# Patient Record
Sex: MALE | Race: WHITE | NOT HISPANIC OR LATINO
[De-identification: names, ages, dates, MRNs, and addresses within clinical notes are randomized per-mention and may not be internally consistent; named-entity substitution may affect disease eponyms.]

---

## 2022-02-02 PROBLEM — Z00.00 ENCOUNTER FOR PREVENTIVE HEALTH EXAMINATION: Status: ACTIVE | Noted: 2022-02-02

## 2022-03-09 ENCOUNTER — APPOINTMENT (OUTPATIENT)
Dept: HEART AND VASCULAR | Facility: CLINIC | Age: 72
End: 2022-03-09

## 2022-03-22 PROBLEM — Z00.00 ENCOUNTER FOR PREVENTIVE HEALTH EXAMINATION: Status: ACTIVE | Noted: 2022-03-22

## 2022-03-30 ENCOUNTER — APPOINTMENT (OUTPATIENT)
Dept: HEART AND VASCULAR | Facility: CLINIC | Age: 72
End: 2022-03-30

## 2022-04-19 ENCOUNTER — APPOINTMENT (OUTPATIENT)
Dept: HEART AND VASCULAR | Facility: CLINIC | Age: 72
End: 2022-04-19

## 2022-11-17 ENCOUNTER — TRANSCRIPTION ENCOUNTER (OUTPATIENT)
Age: 72
End: 2022-11-17

## 2023-01-01 ENCOUNTER — TRANSCRIPTION ENCOUNTER (OUTPATIENT)
Age: 73
End: 2023-01-01

## 2023-01-02 ENCOUNTER — TRANSCRIPTION ENCOUNTER (OUTPATIENT)
Age: 73
End: 2023-01-02

## 2023-01-07 ENCOUNTER — TRANSCRIPTION ENCOUNTER (OUTPATIENT)
Age: 73
End: 2023-01-07

## 2023-03-17 ENCOUNTER — TRANSCRIPTION ENCOUNTER (OUTPATIENT)
Age: 73
End: 2023-03-17

## 2023-09-01 ENCOUNTER — TRANSCRIPTION ENCOUNTER (OUTPATIENT)
Age: 73
End: 2023-09-01

## 2023-09-19 ENCOUNTER — APPOINTMENT (OUTPATIENT)
Dept: NEUROSURGERY | Facility: CLINIC | Age: 73
End: 2023-09-19

## 2024-01-07 ENCOUNTER — APPOINTMENT (OUTPATIENT)
Dept: NEUROSURGERY | Facility: HOSPITAL | Age: 74
End: 2024-01-07

## 2024-01-07 ENCOUNTER — TRANSCRIPTION ENCOUNTER (OUTPATIENT)
Age: 74
End: 2024-01-07

## 2024-01-18 ENCOUNTER — TRANSCRIPTION ENCOUNTER (OUTPATIENT)
Age: 74
End: 2024-01-18

## 2024-02-06 ENCOUNTER — APPOINTMENT (OUTPATIENT)
Dept: NEUROSURGERY | Facility: CLINIC | Age: 74
End: 2024-02-06
Payer: MEDICARE

## 2024-02-15 ENCOUNTER — APPOINTMENT (OUTPATIENT)
Dept: NEUROSURGERY | Facility: CLINIC | Age: 74
End: 2024-02-15
Payer: MEDICARE

## 2024-02-15 ENCOUNTER — RESULT REVIEW (OUTPATIENT)
Age: 74
End: 2024-02-15

## 2024-02-15 VITALS
BODY MASS INDEX: 27.09 KG/M2 | WEIGHT: 200 LBS | HEART RATE: 78 BPM | HEIGHT: 72 IN | OXYGEN SATURATION: 97 % | DIASTOLIC BLOOD PRESSURE: 65 MMHG | SYSTOLIC BLOOD PRESSURE: 96 MMHG

## 2024-02-15 PROCEDURE — 99024 POSTOP FOLLOW-UP VISIT: CPT

## 2024-02-15 NOTE — ASSESSMENT
[FreeTextEntry1] : EVERETT STOCKTON is a 73 year old male with a PMH of atrial fibrillation on Eliquis (currently on hold), chronic alcohol use, Heart failure with preserved EF s/p ICD, hyperlipidemia, BPH, prostate cancer with mets to bone, presents s/p right sided craniotomy for drainage of acute on chronic subdural hematoma on 1/7/24. He is doing well post op and progressing at [facility]. He is neurologically intact. His incision is healing well.   I reviewed his latest CT scan and compared it to his CT scan on 1/13/24, 1/10/24, and 1/5/24.  He presented with a large right-sided acute subdural hematoma on 1/5/24 with midline shift.  He had no focal neurologic deficits at that time and was watched closely in the ICU with close interval repeat imaging showing stability. The initial goal was for the hemorrhage to become more chronic prior to evacuation to allow for a smaller operation, however 2 days later he was noted to be having speech difficulties and had a minor left upper extremity drift, and at that time it was determined to take him to the operating room for evacuation.  He did well postoperatively and had good evacuation on postop imaging.  He was instructed to continue using fragrance-free shampoo to clean incision twice daily. He should continue the current pain regimen.  He should participate in outpatient PT once discharged from rehab. He should continue to abstain from alcohol. I advised him to follow up with neurology for further AED management.  From neurological standpoint, he should resume Eliquis, but given his gait instability I recommend he first have a discussion with his cardiologist regarding the risk and benefit if he was to fall again. He should return to the office in 4 weeks time for a progress check with CT head prior.  The patient understands the plan of care and is in agreement with it.

## 2024-02-15 NOTE — DATA REVIEWED
[de-identified] :   CLINICAL INDICATION: Post right frontal craniotomy for subdural hematoma  5mm axial sections of the brain were obtained from base to vertex, without the intravenous administration of contrast material. Coronal and sagittal computer generated reconstructed views are available.  Comparison is made with the prior CT of 1/10/2024 and demonstrates no significant interval change.   There is been a right frontal craniotomy. There is a small amount of air and hemorrhage subjacent to the craniotomy flap. There are low density there are small bilateral frontal mixed density subdural collections. A small amount of left frontal subarachnoid hemorrhage is identified. There is a right frontal parafalcine subdural hemorrhage. Mild atrophy.     IMPRESSION: No change since 1/10/2024.    CLINICAL INDICATION: Post removal of right frontal subdural drain for subdural hematoma  5mm axial sections of the brain were obtained from base to vertex, without the intravenous administration of contrast material. Coronal and sagittal computer generated reconstructed views are available.  Comparison is made with prior CT of 1/9/2024.  There is a right frontal craniotomy. The right frontal subdural drain has been removed. There is a small amount of residual mixed density hemorrhage and air. There is left frontal and right parasagittal frontal subarachnoid hemorrhage and a small amount parasagittal subdural hemorrhage, unchanged. Mild atrophy is identified with ventricular and sulcal prominence there has been previous bilateral lens replacement surgery.  IMPRESSION: Post removal of a right frontal subdural drain there is a small amount of residual mixed density collection. No change in right frontal parasagittal and left frontal cortical subarachnoid hemorrhage and a small amount of parafalcine subdural hemorrhage compared with 1/9/2024.    EXAM: CT HEAD WITHOUT INTRAVENOUS CONTRAST  HISTORY: History of right craniotomy for subdural hematoma, follow-up evaluation.  TECHNIQUE: Multiple axial images were obtained from the skull base to the vertex. Sagittal and coronal reformatted images were obtained from the axial data set. The images were reviewed in brain and bone windows.  COMPARISON: CT of the head January 8, 2024  FINDINGS:   Postoperative changes related to a right frontal craniotomy. A subdural drainage catheter is seen in place. Residual subdural hematoma along the right frontal convexity, measuring upwards of 0.6 cm in thickness, grossly stable. No significant mass affect on the adjacent cerebral hemisphere. No significant midline shift. Residual pneumocephalus along the right frontal convexity, mildly improved.  Subdural hemorrhage along the anterior falx, measuring upwards of 0.7 cm in thickness, minimally increased in size when compared to prior imaging, previously 0.5 cm.  Small focus of extra-axial blood along the medial anterior left frontal convexity, grossly stable.  Grossly stable subarachnoid hemorrhage along the left frontal lobe sulci.  No hydrocephalus. Small areas of decreased attenuation in the deep and periventricular white matter, compatible with chronic small vessel disease.  The cranial cervical junction is within normal limits. The sella is not expanded. No depressed calvarial fracture. Partially visualized mucous retention cyst in the right maxillary sinus. The visualized mastoid air cells are well aerated. The visualized orbits are status post cataract surgery.  IMPRESSION:   1.  Postoperative changes related to a right frontal craniotomy. 2.  Residual subdural hematoma along the right frontal convexity, grossly stable. 3.  Subdural hemorrhage along the anterior falx, mildly increased in size when compared to prior imaging.   1/5/24 Technique: CT head was performed utilizing axial images from the base of the skull through the vertex without the administration of intravenous contrast. Images were reviewed in the bone, brain and subdural windows.  Findings: There are no prior studies available for comparison.  There is a right frontal parietal temporal occipital acute subdural hemorrhage measuring approximately 1.7 cm in largest diameter overlying the right frontal convexity. There is mild acute hemorrhage seen within the right parafalcine region and overlying the right tentorium. There is effacement of the right cerebral sulci. There is effacement of the right sylvian fissure. There is midline shift to the left approximately 0.7 cm at the level of the third ventricle. There is mass effect on the right lateral ventricle. There is a small amount of acute hemorrhage within the left central sulcus. There is slight prominence of the temporal horns There is no evidence of an intra-axial fluid collection.  Visualized paranasal sinuses and bilateral mastoid air cells are clear.no acute fractures are seen.  Impression: Right holohemispheric acute cerebral hemorrhage with midline shift to the left approximately 0.7 cm at the level of the third ventricle. Small amount of subarachnoid hemorrhage in the left central sulcus.

## 2024-02-15 NOTE — HISTORY OF PRESENT ILLNESS
[FreeTextEntry1] : EVERETT STOCKTON is a 73 year old male with a PMH of atrial fibrillation on Eliquis (currently on hold), chronic alcohol use, Heart failure with preserved EF, hyperlipidemia, BPH, prostate cancer with mets to bone, presents s/p right sided craniotomy for drainage of acute on chronic subdural hematoma on 1/7/24.  He presented to [facility] after a fall with head strike on 1/4/24.  He denied LOC. He has a history of multiple falls and seen by neurosurgery previously on 8/2023 for small L SDH. Initial CT head on 1/5/24 revealed  Right holohemispheric acute cerebral hemorrhage with midline shift to the left approximately 0.7 cm at the level of the third ventricle. Small amount of subarachnoid hemorrhage in the left central sulcus. Hospital course notable for Kcentra given for Eliquis reversal. He went to the OR on 1/7/24 for right sided craniotomy for drainage of acute on chronic subdural hematoma. He had an episode of slurred speech during his hospitalization after Oxycodone use that resolved. Repeat CT head stable at the time and Keppra increased from 500 to 750 mg BID. Eliquis currently on hold. He was discharged to [facility] on 1/18/24. He underwent repeat CT head today. He has not followed up with neurology for AED management. He has not seen his cardiologist regarding the Eliquis. The patient denies new pain, headaches, numbness, tingling, weakness, bowel/bladder dysfunction, gait disturbance, fever, chills, drainage from incision, redness at incision site.  Surg Hx:    Meds:  Atorvastatin 40 mg, Vitamin D3 1000 units, Furosemide 20 mg, Lansoprazole 30 mg, Metoprolol XL 75 MG, Entresto 24 mg- 26 mg BID, Flomax 0.4 mg Allergies: PCN  Soc Hx: nonsmoker, EtOH use

## 2024-02-15 NOTE — REASON FOR VISIT
[de-identified] :  Right sided craniotomy for drainage of acute on chronic subdural hematoma   [de-identified] : 1/7/24 [de-identified] : 4

## 2024-03-10 ENCOUNTER — NON-APPOINTMENT (OUTPATIENT)
Age: 74
End: 2024-03-10

## 2024-03-10 DIAGNOSIS — I42.9 CARDIOMYOPATHY, UNSPECIFIED: ICD-10-CM

## 2024-03-10 DIAGNOSIS — K86.0 ALCOHOL-INDUCED CHRONIC PANCREATITIS: ICD-10-CM

## 2024-03-10 DIAGNOSIS — I48.0 PAROXYSMAL ATRIAL FIBRILLATION: ICD-10-CM

## 2024-03-10 DIAGNOSIS — I10 ESSENTIAL (PRIMARY) HYPERTENSION: ICD-10-CM

## 2024-03-10 DIAGNOSIS — E78.5 HYPERLIPIDEMIA, UNSPECIFIED: ICD-10-CM

## 2024-03-10 DIAGNOSIS — K21.9 GASTRO-ESOPHAGEAL REFLUX DISEASE W/OUT ESOPHAGITIS: ICD-10-CM

## 2024-03-10 DIAGNOSIS — Z85.46 PERSONAL HISTORY OF MALIGNANT NEOPLASM OF PROSTATE: ICD-10-CM

## 2024-03-10 NOTE — CARDIOLOGY SUMMARY
[de-identified] : 2/22 akinesis of the apical, inferoapical , Adama apical, apical lateral and apical septal segments., lae, ef 30%, RVE, pasp 47mmhg,AMAYA, mac, mild mr

## 2024-03-10 NOTE — PHYSICAL EXAM
[Well Developed] : well developed [Well Nourished] : well nourished [Normal Conjunctiva] : normal conjunctiva [No Acute Distress] : no acute distress [No Carotid Bruit] : no carotid bruit [Normal Venous Pressure] : normal venous pressure [No Murmur] : no murmur [Normal S1, S2] : normal S1, S2 [No Rub] : no rub [No Gallop] : no gallop [Good Air Entry] : good air entry [No Respiratory Distress] : no respiratory distress  [Clear Lung Fields] : clear lung fields [Soft] : abdomen soft [Non Tender] : non-tender [Normal Bowel Sounds] : normal bowel sounds [No Masses/organomegaly] : no masses/organomegaly [No Edema] : no edema [Normal Gait] : normal gait [No Cyanosis] : no cyanosis [No Clubbing] : no clubbing [No Varicosities] : no varicosities [No Rash] : no rash [Moves all extremities] : moves all extremities [No Skin Lesions] : no skin lesions [No Focal Deficits] : no focal deficits [Alert and Oriented] : alert and oriented [Normal Speech] : normal speech [Normal memory] : normal memory

## 2024-03-10 NOTE — HISTORY OF PRESENT ILLNESS
[FreeTextEntry1] : Mr Davalos has a history of NSTEMI 2022, ef 30%,  pafib ,VT with ICD, alcohol use disorder s/p SDH 1/24, s/p craniotomy /evacuation 1/7/24, eliquis reversed. He has had other falls,

## 2024-03-11 ENCOUNTER — APPOINTMENT (OUTPATIENT)
Dept: CARDIOLOGY | Facility: CLINIC | Age: 74
End: 2024-03-11

## 2024-03-22 ENCOUNTER — RESULT REVIEW (OUTPATIENT)
Age: 74
End: 2024-03-22

## 2024-04-05 ENCOUNTER — APPOINTMENT (OUTPATIENT)
Dept: NEUROSURGERY | Facility: CLINIC | Age: 74
End: 2024-04-05
Payer: MEDICARE

## 2024-04-05 DIAGNOSIS — S06.5XAA TRAUMATIC SUBDURAL HEMORRHAGE WITH LOSS OF CONSCIOUSNESS STATUS UNKNOWN, INITIAL ENCOUNTER: ICD-10-CM

## 2024-04-05 DIAGNOSIS — Z98.890 OTHER SPECIFIED POSTPROCEDURAL STATES: ICD-10-CM

## 2024-04-05 PROCEDURE — 99443: CPT | Mod: 93

## 2024-04-08 PROBLEM — Z98.890 STATUS POST CRANIOTOMY: Status: ACTIVE | Noted: 2024-04-08

## 2024-04-08 PROBLEM — S06.5XAA SUBDURAL HEMATOMA: Status: ACTIVE | Noted: 2023-09-01

## 2024-04-08 NOTE — ASSESSMENT
[FreeTextEntry1] : EVERETT STOCKTON is a 73 year old male with a PMH of atrial fibrillation on Eliquis (currently on hold), chronic alcohol use, Heart failure with preserved EF s/p ICD, hyperlipidemia, BPH, prostate cancer with mets to bone, presents s/p right sided craniotomy for drainage of acute on chronic subdural hematoma on 1/7/24. He is doing well post op and progressing at [facility] where he has remained since discharge from the hospital. He is neurologically intact. His incision is healing well.   I reviewed his latest CT scan and compared it to his CT scan on 1/13/24, 1/10/24, 1/5/24, 3/22/24.  The most recent CT scan demonstrated resolution of the subdural hematoma and expected post operative changes.   He presented with a large right-sided acute subdural hematoma on 1/5/24 with midline shift.  He had no focal neurologic deficits at that time and was watched closely in the ICU with close interval repeat imaging showing stability. The initial goal was for the hemorrhage to become more chronic prior to evacuation to allow for a smaller operation, however 2 days later he was noted to be having speech difficulties and had a minor left upper extremity drift, and at that time it was determined to take him to the operating room for evacuation.  He did well postoperatively and had good evacuation on postop imaging.  He was instructed to continue using fragrance-free shampoo to clean incision twice daily. He should continue the current pain regimen.  He should participate in outpatient PT once discharged from rehab. He should continue to abstain from alcohol. I advised him to follow up with neurology for further AED management.  From neurological standpoint, he is safe karmen resume Eliquis, but given his gait instability I recommend he first have a discussion with his cardiologist regarding the risk and benefit if he was to fall again.  He had an appointment with a cardiologist which was cancelled.  He should return to the office in three months time for a progress check with CT head prior (virtual visit is acceptable).  The patient understands the plan of care and is in agreement with it.  I spent 30 minutes relative to this patient encounter.

## 2024-04-08 NOTE — HISTORY OF PRESENT ILLNESS
[FreeTextEntry1] : The patient has given verbal consent for this telehealth visit using two-way audio technology.  The patient is currently located at home [address] [place] [address], and I am located at my office at [facility].  Nelson Allen presents s/p right sided craniotomy for drainage of acute on chronic subdural hematoma on 1/7/24.  He remains in rehab, and is eager to be discharged. He is having difficulty sleeping, noting that he previously took sleeping pills to aid him with this. He underwent repeat CT head which revealed a hypodense epidural collection measuring 1 cm thickness under the skull flap, smaller in size compared to previous CT scan. The patient denies new pain, numbness, tingling, weakness, bowel/bladder dysfunction, gait disturbance, fever, chills, drainage from incision, redness at incision site.  2/15/24: NELSON ALLEN is a 73 year old male with a PMH of atrial fibrillation on Eliquis (currently on hold), chronic alcohol use, Heart failure with preserved EF, hyperlipidemia, BPH, prostate cancer with mets to bone, presents s/p right sided craniotomy for drainage of acute on chronic subdural hematoma on 1/7/24.  He presented to [facility] after a fall with head strike on 1/4/24.  He denied LOC. He has a history of multiple falls and seen by neurosurgery previously on 8/2023 for small L SDH. Initial CT head on 1/5/24 revealed  Right holohemispheric acute cerebral hemorrhage with midline shift to the left approximately 0.7 cm at the level of the third ventricle. Small amount of subarachnoid hemorrhage in the left central sulcus. Hospital course notable for Kcentra given for Eliquis reversal. He went to the OR on 1/7/24 for right sided craniotomy for drainage of acute on chronic subdural hematoma. He had an episode of slurred speech during his hospitalization after Oxycodone use that resolved. Repeat CT head stable at the time and Keppra increased from 500 to 750 mg BID. Eliquis currently on hold. He was discharged to [facility] on 1/18/24. He underwent repeat CT head today. He has not followed up with neurology for AED management. He has not seen his cardiologist regarding the Eliquis. The patient denies new pain, headaches, numbness, tingling, weakness, bowel/bladder dysfunction, gait disturbance, fever, chills, drainage from incision, redness at incision site.  Surg Hx: Right sided craniotomy for drainage of acute on chronic subdural hematoma on 1/7/24  Meds:  Atorvastatin 40 mg, Vitamin D3 1000 units, Furosemide 20 mg, Lansoprazole 30 mg, Metoprolol XL 75 MG, Entresto 24 mg- 26 mg BID, Flomax 0.4 mg Allergies: PCN  Soc Hx: nonsmoker, EtOH use

## 2024-04-08 NOTE — DATA REVIEWED
[de-identified] :   CLINICAL INDICATION: Post right frontal craniotomy for subdural hematoma  5mm axial sections of the brain were obtained from base to vertex, without the intravenous administration of contrast material. Coronal and sagittal computer generated reconstructed views are available.  Comparison is made with the prior CT of 1/10/2024 and demonstrates no significant interval change.   There is been a right frontal craniotomy. There is a small amount of air and hemorrhage subjacent to the craniotomy flap. There are low density there are small bilateral frontal mixed density subdural collections. A small amount of left frontal subarachnoid hemorrhage is identified. There is a right frontal parafalcine subdural hemorrhage. Mild atrophy.     IMPRESSION: No change since 1/10/2024.    CLINICAL INDICATION: Post removal of right frontal subdural drain for subdural hematoma  5mm axial sections of the brain were obtained from base to vertex, without the intravenous administration of contrast material. Coronal and sagittal computer generated reconstructed views are available.  Comparison is made with prior CT of 1/9/2024.  There is a right frontal craniotomy. The right frontal subdural drain has been removed. There is a small amount of residual mixed density hemorrhage and air. There is left frontal and right parasagittal frontal subarachnoid hemorrhage and a small amount parasagittal subdural hemorrhage, unchanged. Mild atrophy is identified with ventricular and sulcal prominence there has been previous bilateral lens replacement surgery.  IMPRESSION: Post removal of a right frontal subdural drain there is a small amount of residual mixed density collection. No change in right frontal parasagittal and left frontal cortical subarachnoid hemorrhage and a small amount of parafalcine subdural hemorrhage compared with 1/9/2024.    EXAM: CT HEAD WITHOUT INTRAVENOUS CONTRAST  HISTORY: History of right craniotomy for subdural hematoma, follow-up evaluation.  TECHNIQUE: Multiple axial images were obtained from the skull base to the vertex. Sagittal and coronal reformatted images were obtained from the axial data set. The images were reviewed in brain and bone windows.  COMPARISON: CT of the head January 8, 2024  FINDINGS:   Postoperative changes related to a right frontal craniotomy. A subdural drainage catheter is seen in place. Residual subdural hematoma along the right frontal convexity, measuring upwards of 0.6 cm in thickness, grossly stable. No significant mass affect on the adjacent cerebral hemisphere. No significant midline shift. Residual pneumocephalus along the right frontal convexity, mildly improved.  Subdural hemorrhage along the anterior falx, measuring upwards of 0.7 cm in thickness, minimally increased in size when compared to prior imaging, previously 0.5 cm.  Small focus of extra-axial blood along the medial anterior left frontal convexity, grossly stable.  Grossly stable subarachnoid hemorrhage along the left frontal lobe sulci.  No hydrocephalus. Small areas of decreased attenuation in the deep and periventricular white matter, compatible with chronic small vessel disease.  The cranial cervical junction is within normal limits. The sella is not expanded. No depressed calvarial fracture. Partially visualized mucous retention cyst in the right maxillary sinus. The visualized mastoid air cells are well aerated. The visualized orbits are status post cataract surgery.  IMPRESSION:   1.  Postoperative changes related to a right frontal craniotomy. 2.  Residual subdural hematoma along the right frontal convexity, grossly stable. 3.  Subdural hemorrhage along the anterior falx, mildly increased in size when compared to prior imaging.   1/5/24 Technique: CT head was performed utilizing axial images from the base of the skull through the vertex without the administration of intravenous contrast. Images were reviewed in the bone, brain and subdural windows.  Findings: There are no prior studies available for comparison.  There is a right frontal parietal temporal occipital acute subdural hemorrhage measuring approximately 1.7 cm in largest diameter overlying the right frontal convexity. There is mild acute hemorrhage seen within the right parafalcine region and overlying the right tentorium. There is effacement of the right cerebral sulci. There is effacement of the right sylvian fissure. There is midline shift to the left approximately 0.7 cm at the level of the third ventricle. There is mass effect on the right lateral ventricle. There is a small amount of acute hemorrhage within the left central sulcus. There is slight prominence of the temporal horns There is no evidence of an intra-axial fluid collection.  Visualized paranasal sinuses and bilateral mastoid air cells are clear.no acute fractures are seen.  Impression: Right holohemispheric acute cerebral hemorrhage with midline shift to the left approximately 0.7 cm at the level of the third ventricle. Small amount of subarachnoid hemorrhage in the left central sulcus.  [de-identified] :  Exam Date:      03/22/24 Exam:         CT HEAD-NON STROKE Order#:       CT [id number]    CLINICAL INDICATIONS:  SUBDURAL HEMATOMA   COMPARISON: Head CT dated 2/15/2024   TECHNIQUE: Noncontrast CT of the head. Multiplanar reformations are submitted.   FINDINGS:  Status post right parietal craniotomy defect. Adjacent to the flap is a hypodense epidural collection measuring 1 cm thickness, smaller in size. Mild mass effect on adjacent frontal lobe, decreased. No vasogenic edema. No acute intracranial hemorrhage.  There is periventricular and subcortical white matter hypodensity without mass effect, nonspecific, likely representing mild chronic microvascular ischemic changes. There is no compelling evidence for an acute transcortical infarction. There is no evidence of mass, mass effect, midline shift or extra-axial fluid collection. The lateral ventricles and cortical sulci are age-appropriate in size and configuration. Moderate right and small left mastoid air cell effusions. The orbits and visualized paranasal sinuses are unremarkable. The calvarium is otherwise intact. Consider MRI as clinically warranted.   IMPRESSION: Status post right parietal craniotomy defect. Adjacent to the flap is a hypodense epidural collection measuring 1 cm thickness, smaller in size. Mild mass effect on adjacent frontal lobe, decreased. No vasogenic edema. No acute intracranial hemorrhage..

## 2024-04-08 NOTE — REASON FOR VISIT
[de-identified] :  Right sided craniotomy for drainage of acute on chronic subdural hematoma   [de-identified] : 1/7/24 [de-identified] : 12 12

## 2024-04-19 ENCOUNTER — APPOINTMENT (OUTPATIENT)
Dept: NEUROSURGERY | Facility: CLINIC | Age: 74
End: 2024-04-19

## 2024-10-17 ENCOUNTER — APPOINTMENT (OUTPATIENT)
Dept: NEUROSURGERY | Facility: CLINIC | Age: 74
End: 2024-10-17
Payer: MEDICARE

## 2024-10-17 DIAGNOSIS — S06.5XAA TRAUMATIC SUBDURAL HEMORRHAGE WITH LOSS OF CONSCIOUSNESS STATUS UNKNOWN, INITIAL ENCOUNTER: ICD-10-CM

## 2024-10-17 DIAGNOSIS — F07.81 POSTCONCUSSIONAL SYNDROME: ICD-10-CM

## 2024-10-17 PROCEDURE — 99214 OFFICE O/P EST MOD 30 MIN: CPT

## 2025-05-05 ENCOUNTER — TRANSCRIPTION ENCOUNTER (OUTPATIENT)
Age: 75
End: 2025-05-05

## 2025-05-26 ENCOUNTER — RESULT REVIEW (OUTPATIENT)
Age: 75
End: 2025-05-26

## 2025-05-28 ENCOUNTER — TRANSCRIPTION ENCOUNTER (OUTPATIENT)
Age: 75
End: 2025-05-28

## 2025-06-12 ENCOUNTER — APPOINTMENT (OUTPATIENT)
Dept: NEUROSURGERY | Facility: CLINIC | Age: 75
End: 2025-06-12
Payer: MEDICARE

## 2025-06-12 PROBLEM — M47.27 LUMBOSACRAL SPONDYLOSIS WITH RADICULOPATHY: Status: ACTIVE | Noted: 2025-06-12

## 2025-06-12 PROCEDURE — 99215 OFFICE O/P EST HI 40 MIN: CPT | Mod: 93

## 2025-06-18 ENCOUNTER — TRANSCRIPTION ENCOUNTER (OUTPATIENT)
Age: 75
End: 2025-06-18

## 2025-07-15 ENCOUNTER — TRANSCRIPTION ENCOUNTER (OUTPATIENT)
Age: 75
End: 2025-07-15

## 2025-07-31 ENCOUNTER — RESULT REVIEW (OUTPATIENT)
Age: 75
End: 2025-07-31